# Patient Record
Sex: FEMALE | Race: WHITE | NOT HISPANIC OR LATINO | Employment: FULL TIME | URBAN - METROPOLITAN AREA
[De-identification: names, ages, dates, MRNs, and addresses within clinical notes are randomized per-mention and may not be internally consistent; named-entity substitution may affect disease eponyms.]

---

## 2019-09-24 ENCOUNTER — HOSPITAL ENCOUNTER (EMERGENCY)
Facility: HOSPITAL | Age: 54
Discharge: HOME | End: 2019-09-24
Attending: EMERGENCY MEDICINE
Payer: COMMERCIAL

## 2019-09-24 ENCOUNTER — APPOINTMENT (EMERGENCY)
Dept: RADIOLOGY | Facility: HOSPITAL | Age: 54
End: 2019-09-24
Attending: EMERGENCY MEDICINE
Payer: COMMERCIAL

## 2019-09-24 VITALS
RESPIRATION RATE: 18 BRPM | BODY MASS INDEX: 20.29 KG/M2 | OXYGEN SATURATION: 98 % | WEIGHT: 137 LBS | TEMPERATURE: 98.1 F | HEART RATE: 70 BPM | HEIGHT: 69 IN | DIASTOLIC BLOOD PRESSURE: 64 MMHG | SYSTOLIC BLOOD PRESSURE: 122 MMHG

## 2019-09-24 DIAGNOSIS — R42 VERTIGO: Primary | ICD-10-CM

## 2019-09-24 LAB
ALBUMIN SERPL-MCNC: 4.4 G/DL (ref 3.4–5)
ALP SERPL-CCNC: 64 IU/L (ref 35–126)
ALT SERPL-CCNC: 12 IU/L (ref 11–54)
ANION GAP SERPL CALC-SCNC: 8 MEQ/L (ref 3–15)
AST SERPL-CCNC: 20 IU/L (ref 15–41)
BASOPHILS # BLD: 0.01 K/UL (ref 0.01–0.1)
BASOPHILS NFR BLD: 0.1 %
BILIRUB SERPL-MCNC: 0.8 MG/DL (ref 0.3–1.2)
BUN SERPL-MCNC: 13 MG/DL (ref 8–20)
CALCIUM SERPL-MCNC: 9.1 MG/DL (ref 8.9–10.3)
CHLORIDE SERPL-SCNC: 102 MEQ/L (ref 98–109)
CO2 SERPL-SCNC: 25 MEQ/L (ref 22–32)
CREAT SERPL-MCNC: 0.7 MG/DL
DIFFERENTIAL METHOD BLD: NORMAL
EOSINOPHIL # BLD: 0.06 K/UL (ref 0.04–0.36)
EOSINOPHIL NFR BLD: 0.7 %
ERYTHROCYTE [DISTWIDTH] IN BLOOD BY AUTOMATED COUNT: 12.6 % (ref 11.7–14.4)
GFR SERPL CREATININE-BSD FRML MDRD: >60 ML/MIN/1.73M*2
GLUCOSE SERPL-MCNC: 111 MG/DL (ref 70–99)
HCT VFR BLDCO AUTO: 41.6 %
HGB BLD-MCNC: 13.1 G/DL
IMM GRANULOCYTES # BLD AUTO: 0.03 K/UL (ref 0–0.08)
IMM GRANULOCYTES NFR BLD AUTO: 0.3 %
LYMPHOCYTES # BLD: 1.41 K/UL (ref 1.2–3.5)
LYMPHOCYTES NFR BLD: 16.3 %
MCH RBC QN AUTO: 28.1 PG (ref 28–33.2)
MCHC RBC AUTO-ENTMCNC: 31.5 G/DL (ref 32.2–35.5)
MCV RBC AUTO: 89.1 FL (ref 83–98)
MONOCYTES # BLD: 0.41 K/UL (ref 0.28–0.8)
MONOCYTES NFR BLD: 4.7 %
NEUTROPHILS # BLD: 6.74 K/UL (ref 1.7–7)
NEUTS SEG NFR BLD: 77.9 %
NRBC BLD-RTO: 0 %
PDW BLD AUTO: 11.5 FL (ref 9.4–12.3)
PLATELET # BLD AUTO: 196 K/UL
POTASSIUM SERPL-SCNC: 3.3 MEQ/L (ref 3.6–5.1)
PROT SERPL-MCNC: 7.6 G/DL (ref 6–8.2)
RBC # BLD AUTO: 4.67 M/UL (ref 3.93–5.22)
SODIUM SERPL-SCNC: 135 MEQ/L (ref 136–144)
TROPONIN I SERPL-MCNC: <0.02 NG/ML
WBC # BLD AUTO: 8.66 K/UL

## 2019-09-24 PROCEDURE — 70450 CT HEAD/BRAIN W/O DYE: CPT

## 2019-09-24 PROCEDURE — 36415 COLL VENOUS BLD VENIPUNCTURE: CPT | Performed by: EMERGENCY MEDICINE

## 2019-09-24 PROCEDURE — 84484 ASSAY OF TROPONIN QUANT: CPT | Performed by: PHYSICIAN ASSISTANT

## 2019-09-24 PROCEDURE — 93005 ELECTROCARDIOGRAM TRACING: CPT | Performed by: PHYSICIAN ASSISTANT

## 2019-09-24 PROCEDURE — 80053 COMPREHEN METABOLIC PANEL: CPT | Performed by: PHYSICIAN ASSISTANT

## 2019-09-24 PROCEDURE — 99284 EMERGENCY DEPT VISIT MOD MDM: CPT | Mod: 25

## 2019-09-24 PROCEDURE — 63700000 HC SELF-ADMINISTRABLE DRUG: Performed by: PHYSICIAN ASSISTANT

## 2019-09-24 PROCEDURE — 25800000 HC PHARMACY IV SOLUTIONS: Performed by: PHYSICIAN ASSISTANT

## 2019-09-24 PROCEDURE — 85025 COMPLETE CBC W/AUTO DIFF WBC: CPT | Performed by: PHYSICIAN ASSISTANT

## 2019-09-24 RX ORDER — MECLIZINE HYDROCHLORIDE 25 MG/1
25 TABLET ORAL 3 TIMES DAILY PRN
Qty: 30 TABLET | Refills: 0 | Status: SHIPPED | OUTPATIENT
Start: 2019-09-24 | End: 2021-07-10

## 2019-09-24 RX ORDER — MECLIZINE HYDROCHLORIDE 25 MG/1
25 TABLET ORAL ONCE
Status: COMPLETED | OUTPATIENT
Start: 2019-09-24 | End: 2019-09-24

## 2019-09-24 RX ADMIN — SODIUM CHLORIDE 1000 ML: 9 INJECTION, SOLUTION INTRAVENOUS at 13:08

## 2019-09-24 RX ADMIN — MECLIZINE HYDROCHLORIDE 25 MG: 25 TABLET ORAL at 13:24

## 2019-09-24 ASSESSMENT — ENCOUNTER SYMPTOMS
COUGH: 0
SHORTNESS OF BREATH: 0
BACK PAIN: 0
NAUSEA: 1
CHILLS: 0
SPEECH DIFFICULTY: 0
LIGHT-HEADEDNESS: 1
NECK PAIN: 0
ABDOMINAL PAIN: 0
DIZZINESS: 1
VOMITING: 1
FEVER: 0

## 2019-09-24 NOTE — DISCHARGE INSTRUCTIONS
Please call your primary care doctor today to inform them of your ER visit today and arrange a follow up appointment within 2 days. Please return immediately to the emergency department for any new/worsening or concerning symptoms. You have been provided with follow-up with an ENT. Please call to make a follow up appointment. Please be sure to mention your cat scan to your primary care doctor so they may follow the incidental findings found on your CT scan so they may follow and perform additional testing/imaging if needed.    We offered to observe you, but you declined and would like to be discharged home. Please follow up with your primary care physician about getting and MRI of the brain.

## 2019-09-24 NOTE — ED PROVIDER NOTES
"HPI     Chief Complaint   Patient presents with   • Vomiting   • Altered Mental Status   • Paresthesia       HPI   54 y.o. female with PMH Graves disease, presents to the ED for evaluation of dizziness onset 0830 this morning. Patent reports she was sitting at her desk at work when she began feeling like the room was spinning and dizziness. At 0900, patient got up to get water and coffee, when dizziness worsened. Patient had trouble reading on computer and trouble focusing at 0945. She walked to the restroom, and began to feel more dizzy and unbalanced. Patient sat in bathroom for 10 minutes, felt like her \"eyes were jumping.\" Patient went back to her desk, drank more water, and took a decongestant at 1030. Reports 1 episode of vomit, non bloody at 1030. Nausea has been intermittent since. Reports associated tingling of her right arm, from her right shoulder down to her fingers. Currently feels room is spinning which is slightly improving, and left thumb tingling. No fevers, chills, abdominal pain, shortness of breath, chest pain, back pain, LOC, neck pain, congestion or rhinorrhea.       Patient History     Past Medical History:   Diagnosis Date   • Graves disease        History reviewed. No pertinent surgical history.    History reviewed. No pertinent family history.    Social History   Substance Use Topics   • Smoking status: Never Smoker   • Smokeless tobacco: Never Used   • Alcohol use Yes      Comment: occasional       Systems Reviewed from Nursing Triage:          Review of Systems     Review of Systems   Constitutional: Negative for chills and fever.   HENT: Negative for congestion and ear pain.    Respiratory: Negative for cough and shortness of breath.    Cardiovascular: Negative for chest pain.   Gastrointestinal: Positive for nausea and vomiting (1 episode PTA). Negative for abdominal pain.   Musculoskeletal: Negative for back pain and neck pain.   Skin: Negative for rash.   Neurological: Positive for " "dizziness and light-headedness. Negative for syncope and speech difficulty.        Physical Exam     ED Triage Vitals   Temp Heart Rate Resp BP SpO2   09/24/19 1214 09/24/19 1159 09/24/19 1159 09/24/19 1159 09/24/19 1159   36.7 °C (98.1 °F) 75 18 (!) 149/84 100 %      Temp Source Heart Rate Source Patient Position BP Location FiO2 (%) (Set)   09/24/19 1159 09/24/19 1258 -- -- --   Oral Monitor                        Patient Vitals for the past 24 hrs:   BP Temp Temp src Pulse Resp SpO2 Height Weight   09/24/19 1315 - - - 66 - 96 % - -   09/24/19 1311 122/70 - - 64 (!) 26 96 % - -   09/24/19 1258 125/74 - - 63 18 97 % - -   09/24/19 1257 125/74 - - 66 (!) 86 98 % - -   09/24/19 1214 - 36.7 °C (98.1 °F) Temporal - - - - -   09/24/19 1159 (!) 149/84 - Oral 75 18 100 % 1.753 m (5' 9\") 62.1 kg (137 lb)             NIH Stroke Scale (Adult)     Row Name 09/24/19 1226                NIH Stroke Scale (Adult)    NIH Stroke Scale NIH Stroke Scale (Group)  -RH       Row Name 09/24/19 1226                NIH Stroke Scale    Interval Upon admission  -RH       1a. Level Of Consciousness 0-->Alert: keenly responsive  -RH       1b. LOC Questions 0-->Answers both questions correctly  -RH       1c. LOC Commands 0-->Performs both tasks correctly  -RH       2. Best Gaze 0-->Normal  -RH       3. Visual 0-->No visual loss  -RH       4. Facial Palsy 0-->Normal symmetrical movements  -RH       5a. Motor Arm, Left 0-->No drift: limb holds 90 (or 45) degrees for full 10 secs  -RH       5b. Motor Arm, Right 0-->No drift: limb holds 90 (or 45) degrees for full 10 secs  -RH       6a. Motor Leg, Left 0-->No drift: leg holds 30 degree position for full 5 secs  -RH       6b. Motor Leg, Right 0-->No drift: leg holds 30 degree position for full 5 secs  -RH       7. Limb Ataxia 0-->Absent  -RH       8. Sensory 0-->Normal: no sensory loss  -RH       9. Best Language 0-->No aphasia: normal  -RH       10. Dysarthria 0-->Normal  -RH       11. Extinction " and Inattention (formerly Neglect) 0-->No abnormality  -RH       Total (NIH Stroke Scale) 0  -RH         User Key  (r) = Recorded By, (t) = Taken By, (c) = Cosigned By    Initials Name    Carissa Flowers PA C          Physical Exam   Constitutional: She is oriented to person, place, and time. She appears well-developed and well-nourished. No distress.   HENT:   Head: Normocephalic and atraumatic.   Eyes: Pupils are equal, round, and reactive to light. EOM are normal.   Neck: Neck supple.   Cardiovascular: Normal rate and regular rhythm.    Pulmonary/Chest: Effort normal. No respiratory distress.   Abdominal: Soft. There is no tenderness.   Neurological: She is alert and oriented to person, place, and time. She displays normal reflexes. No sensory deficit. She exhibits normal muscle tone.   CN II-XII grossly intact. No gross motor deficit. Symmetric facial movements. 5/5 strength to upper and lower extremities B/L. Speech is clear. No pronator drift. Normal finger to nose B/L. Normal heel to shin. Positive Mack-Hallpike maneuver. Symptoms did not resolve with Epley . NIH 0.   Symptoms exacerbates with standing and ambulation.       Skin: Skin is warm and dry.   Psychiatric: She has a normal mood and affect.   Nursing note and vitals reviewed.           Procedures    ED Course & MDM     Labs Reviewed   CBC AND DIFFERENTIAL    Narrative:     The following orders were created for panel order CBC and differential.  Procedure                               Abnormality         Status                     ---------                               -----------         ------                     CBC[[id number]]                                               In process                 Diff Count[[id number]]                                        In process                   Please view results for these tests on the individual orders.   COMPREHENSIVE METABOLIC PANEL   TROPONIN I   CBC   DIFF COUNT   RAINBOW DRAW PANEL    Narrative:      The following orders were created for panel order [place] Draw Panel.  Procedure                               Abnormality         Status                     ---------                               -----------         ------                     RAINBOW RED[[id number]]                                       In process                 RAINBOW LT BLUE[[id number]]                                   In process                 RAINBOW GOLD[[id number]]                                      In process                   Please view results for these tests on the individual orders.   RAINBOW RED   RAINBOW LT BLUE   RAINBOW GOLD       ECG 12 lead         CT HEAD WITHOUT IV CONTRAST   Final Result   IMPRESSION:   No CT evidence of an acute large vascular territorial infarct, acute   intracranial hemorrhage or mass.   Probable mild chronic microangiopathy.                  [facility]         ED Course as of Sep 26 0351   Tue Sep 24, 2019   1225 Impression: vertigo with postive Mack-Hallpike maneuver  Plan: Labs, EKG, non contrast head CT, meclazine if CT negative  [CN]   1305 IMPRESSION:  No CT evidence of an acute large vascular territorial infarct, acute  intracranial hemorrhage or mass.  Probable mild chronic microangiopathy. CT HEAD WITHOUT IV CONTRAST [RH]   1306 Ordered meclizine  [RH]   1340 CBC unremarkable  [RH]   1359 Mildly low Potassium: (!) 3.3 [RH]   1400 Mildly low Sodium: (!) 135 [RH]   1500 Symptoms improved with Meclizine. Patient would like to be discharged home. She has not questions at discharge. To patient's bedside. Patient exam unchanged. Patient sitting quietly and comfortably in the stretcher.  Discussed results and plan with patient.  Patient verbalized understanding and agreeable without any questions or concerns.      Extensive education provided on signs and symptoms that would warrant a return visit to the emergency department along with emphasis on importance of follow-up.  Patient verbalized understanding and  agreeable.  All questions answered    [RH]      ED Course User Index  [CN] Brenda Coffey  [RH] Carissa Pinto PA C         Clinical Impressions as of Sep 26 0351   Vertigo           By signing my name below, I, Brenda Coffey, attest that this documentation has been prepared under the direction and in the presence of Carissa Pinto PA-C.    9/24/2019 1:34 PM      ICarissa, personally performed the services described in this documentation, as documented by the scribe in my presence, and it is both accurate and complete.  9/26/2019 3:50 AM             Brenda Coffey  09/24/19 1335       Carissa Pinto PA C  09/26/19 0351

## 2019-09-25 LAB
ATRIAL RATE: 71
P AXIS: 36
PR INTERVAL: 182
QRS DURATION: 100
QT INTERVAL: 424
QTC CALCULATION(BAZETT): 460
R AXIS: 43
T WAVE AXIS: 31
VENTRICULAR RATE: 71

## 2019-09-26 NOTE — ED ATTESTATION NOTE
I have personally seen and examined the patient.  I reviewed and agree with physician assistant / nurse practitioner’s assessment and plan of care, with the following exceptions: None  My examination, assessment, and plan of care of Ely Robbins is as follows:    The patient is a 54-year-old female with past medical history of Graves' disease, who presented to the emergency department for evaluation of vertigo.  The patient reports she was at work today when she had sudden onset of vertigo at approximately 9 AM.  The patient notes the vertigo symptoms are worse when she was standing.  The patient's symptoms have been constant for several hours.  Patient noted right upper extremity paresthesias.  Patient did have an episode of vomiting.  The patient denies vision change, speech change, fever, chills, cough, chest pain, dyspnea, abdominal pain, lower extremity pain, edema, or rash.  The patient denies hearing loss, tinnitus, or ear pain.  No history of recent trauma.    Physical exam: Vital signs reviewed.  General: Patient appears comfortable sitting up in bed.  HEENT: NCAT, EOMI, MMM.  Neck: Supple, nontender, full range of motion.  Chest: Lungs clear to auscultation bilaterally, rales.  Heart: Regular rate and rhythm no murmurs.  Abdomen: Soft, nontender.  Neuro: GCS 15, 5/5 strength bilateral upper and lower extremities, sensation normal no nystagmus, pupils equal round reactive to light, cranial nerves II to XII intact, coordination normal.    Plan: Meclizine p.o.  Check labs.  CT scan brain.    The patient's labs and CT scan results are reviewed.  There is no evidence of ICH.  The patient's symptoms improved after meclizine in the emergency department and the patient wishes to be discharged.  Patient was advised she will need close follow-up with her family doctor for additional evaluation.  The patient agrees to return ambulate for any new, worsening, or concerning symptoms.    Impression: Acute  vertigo.        I was physically present for the key/critical portions of the following procedures: None       Jesse Castaneda MD  09/26/19 7547

## 2021-07-10 ENCOUNTER — HOSPITAL ENCOUNTER (OUTPATIENT)
Facility: CLINIC | Age: 56
Discharge: HOME | End: 2021-07-10
Attending: FAMILY MEDICINE
Payer: COMMERCIAL

## 2021-07-10 VITALS — OXYGEN SATURATION: 100 % | DIASTOLIC BLOOD PRESSURE: 68 MMHG | SYSTOLIC BLOOD PRESSURE: 118 MMHG | TEMPERATURE: 97.8 F

## 2021-07-10 DIAGNOSIS — J02.9 PHARYNGITIS, UNSPECIFIED ETIOLOGY: Primary | ICD-10-CM

## 2021-07-10 LAB — S PYO AG THROAT QL: NEGATIVE

## 2021-07-10 PROCEDURE — 99202 OFFICE O/P NEW SF 15 MIN: CPT | Performed by: FAMILY MEDICINE

## 2021-07-10 PROCEDURE — 87880 STREP A ASSAY W/OPTIC: CPT | Performed by: FAMILY MEDICINE

## 2021-07-10 PROCEDURE — S9083 URGENT CARE CENTER GLOBAL: HCPCS | Performed by: FAMILY MEDICINE

## 2021-07-10 RX ORDER — AMOXICILLIN 875 MG/1
875 TABLET, FILM COATED ORAL 2 TIMES DAILY
Qty: 14 TABLET | Refills: 0 | Status: SHIPPED | OUTPATIENT
Start: 2021-07-10 | End: 2021-07-17

## 2021-07-10 NOTE — ED PROVIDER NOTES
History  Chief Complaint   Patient presents with   • Diarrhea     ongoing for 4 days - Sore throat ongoing x 1 week, swollen glands tender to touch     Pt here with complaint of sore throat for the past 5 days, no cough, some diarrhea the last 3 days.  No obvious sick contacts.  No other complaints.           Past Medical History:   Diagnosis Date   • Graves disease        No past surgical history on file.    No family history on file.    Social History     Tobacco Use   • Smoking status: Never Smoker   • Smokeless tobacco: Never Used   Substance Use Topics   • Alcohol use: Yes     Comment: occasional   • Drug use: No       Review of Systems    Physical Exam  ED Triage Vitals [07/10/21 1316]   Temp Pulse Resp BP SpO2   36.6 °C (97.8 °F) -- -- 118/68 100 %      Temp Source Heart Rate Source Patient Position BP Location FiO2 (%) (Set)   Tympanic -- Standing Left upper arm --       Physical Exam  Vitals and nursing note reviewed.   Constitutional:       General: She is not in acute distress.     Appearance: She is well-developed.   HENT:      Head: Normocephalic and atraumatic.      Comments: Oropharynx with erythema no exudate, no tonsilar swelling  Eyes:      Conjunctiva/sclera: Conjunctivae normal.   Cardiovascular:      Rate and Rhythm: Normal rate and regular rhythm.      Heart sounds: No murmur heard.     Pulmonary:      Effort: Pulmonary effort is normal. No respiratory distress.      Breath sounds: Normal breath sounds.   Abdominal:      Palpations: Abdomen is soft.      Tenderness: There is no abdominal tenderness.   Musculoskeletal:      Cervical back: Neck supple.   Skin:     General: Skin is warm and dry.   Neurological:      Mental Status: She is alert.           Procedures  Procedures    UC Course  Clinical Impressions as of Jul 10 1348   Pharyngitis, unspecified etiology       MDM  Number of Diagnoses or Management Options  Pharyngitis, unspecified etiology  Diagnosis management comments: Negative rapid  strep but without cough and persistent pain and erythema cover with amoxicillin, follow up with Ted Narvaez DO  07/10/21 6848

## 2024-07-08 ENCOUNTER — APPOINTMENT (OUTPATIENT)
Age: 59
Setting detail: DERMATOLOGY
End: 2024-07-09

## 2024-07-08 PROBLEM — C44.719 BASAL CELL CARCINOMA OF SKIN OF LEFT LOWER LIMB, INCLUDING HIP: Status: ACTIVE | Noted: 2024-07-08

## 2024-07-08 PROCEDURE — 99213 OFFICE O/P EST LOW 20 MIN: CPT

## 2024-07-08 PROCEDURE — OTHER PRESCRIPTION: OTHER

## 2024-07-08 PROCEDURE — OTHER CONSULTATION FOR MOHS SURGERY: OTHER

## 2024-07-08 RX ORDER — IMIQUIMOD 12.5 MG/.25G
CREAM TOPICAL
Qty: 30 | Refills: 0 | Status: ERX | COMMUNITY
Start: 2024-07-08

## 2024-07-08 NOTE — PROCEDURE: CONSULTATION FOR MOHS SURGERY
Detail Level: Detailed
X Size Of Lesion In Cm (Optional): 0
Name Of The Referring Provider For Procedure: Kendall Cabello MD
Other Plan: Prescription ofimiquimod sent in
Incorporate Mauc In Note: Yes

## 2024-07-08 NOTE — HPI: SKIN LESION (BASAL CELL CARCINOMA)
Is This A New Presentation, Or A Follow-Up?: Basal Cell Carcinoma
When Was Basal Cell Biopsied? (Optional): 11-22-23
Accession # (Optional): [id number]

## 2024-11-04 ENCOUNTER — APPOINTMENT (OUTPATIENT)
Age: 59
Setting detail: DERMATOLOGY
End: 2024-11-05

## 2024-11-04 DIAGNOSIS — L81.0 POSTINFLAMMATORY HYPERPIGMENTATION: ICD-10-CM

## 2024-11-04 PROBLEM — C44.712 BASAL CELL CARCINOMA OF SKIN OF RIGHT LOWER LIMB, INCLUDING HIP: Status: ACTIVE | Noted: 2024-11-04

## 2024-11-04 PROBLEM — C44.719 BASAL CELL CARCINOMA OF SKIN OF LEFT LOWER LIMB, INCLUDING HIP: Status: ACTIVE | Noted: 2024-11-04

## 2024-11-04 PROCEDURE — 99213 OFFICE O/P EST LOW 20 MIN: CPT

## 2024-11-04 PROCEDURE — OTHER PRESCRIPTION: OTHER

## 2024-11-04 PROCEDURE — OTHER COUNSELING: OTHER

## 2024-11-04 RX ORDER — IMIQUIMOD 12.5 MG/.25G
CREAM TOPICAL
Qty: 24 | Refills: 2 | Status: ERX

## 2024-11-04 ASSESSMENT — LOCATION DETAILED DESCRIPTION DERM
LOCATION DETAILED: LEFT KNEE
LOCATION DETAILED: RIGHT PROXIMAL PRETIBIAL REGION

## 2024-11-04 ASSESSMENT — LOCATION ZONE DERM: LOCATION ZONE: LEG

## 2024-11-04 ASSESSMENT — LOCATION SIMPLE DESCRIPTION DERM
LOCATION SIMPLE: RIGHT PRETIBIAL REGION
LOCATION SIMPLE: LEFT KNEE